# Patient Record
Sex: MALE | Race: WHITE
[De-identification: names, ages, dates, MRNs, and addresses within clinical notes are randomized per-mention and may not be internally consistent; named-entity substitution may affect disease eponyms.]

---

## 2019-02-12 ENCOUNTER — HOSPITAL ENCOUNTER (OUTPATIENT)
Dept: HOSPITAL 90 - SHCH | Age: 74
Discharge: HOME | End: 2019-02-12
Attending: INTERNAL MEDICINE
Payer: OTHER GOVERNMENT

## 2019-02-12 DIAGNOSIS — I25.10: Primary | ICD-10-CM

## 2019-02-12 PROCEDURE — 93017 CV STRESS TEST TRACING ONLY: CPT

## 2019-02-12 PROCEDURE — 78452 HT MUSCLE IMAGE SPECT MULT: CPT

## 2019-02-12 PROCEDURE — 96374 THER/PROPH/DIAG INJ IV PUSH: CPT

## 2019-02-12 RX ADMIN — REGADENOSON SCH MG: 0.08 INJECTION, SOLUTION INTRAVENOUS at 10:31

## 2019-10-22 ENCOUNTER — HOSPITAL ENCOUNTER (OUTPATIENT)
Dept: HOSPITAL 90 - ENDO | Age: 74
Discharge: HOME | End: 2019-10-22
Attending: INTERNAL MEDICINE
Payer: OTHER GOVERNMENT

## 2019-10-22 VITALS — SYSTOLIC BLOOD PRESSURE: 159 MMHG | DIASTOLIC BLOOD PRESSURE: 93 MMHG

## 2019-10-22 VITALS — BODY MASS INDEX: 26.07 KG/M2 | HEIGHT: 68 IN | WEIGHT: 172 LBS

## 2019-10-22 VITALS — SYSTOLIC BLOOD PRESSURE: 133 MMHG | DIASTOLIC BLOOD PRESSURE: 108 MMHG

## 2019-10-22 VITALS — DIASTOLIC BLOOD PRESSURE: 108 MMHG | SYSTOLIC BLOOD PRESSURE: 133 MMHG

## 2019-10-22 VITALS — DIASTOLIC BLOOD PRESSURE: 78 MMHG | SYSTOLIC BLOOD PRESSURE: 153 MMHG

## 2019-10-22 VITALS — DIASTOLIC BLOOD PRESSURE: 104 MMHG | SYSTOLIC BLOOD PRESSURE: 170 MMHG

## 2019-10-22 VITALS — SYSTOLIC BLOOD PRESSURE: 164 MMHG | DIASTOLIC BLOOD PRESSURE: 93 MMHG

## 2019-10-22 DIAGNOSIS — K29.50: ICD-10-CM

## 2019-10-22 DIAGNOSIS — K29.70: ICD-10-CM

## 2019-10-22 DIAGNOSIS — E78.5: ICD-10-CM

## 2019-10-22 DIAGNOSIS — Z79.899: ICD-10-CM

## 2019-10-22 DIAGNOSIS — Z79.82: ICD-10-CM

## 2019-10-22 DIAGNOSIS — E11.40: ICD-10-CM

## 2019-10-22 DIAGNOSIS — K21.0: ICD-10-CM

## 2019-10-22 DIAGNOSIS — K44.9: ICD-10-CM

## 2019-10-22 DIAGNOSIS — I25.10: ICD-10-CM

## 2019-10-22 DIAGNOSIS — I10: ICD-10-CM

## 2019-10-22 DIAGNOSIS — K22.2: Primary | ICD-10-CM

## 2019-10-22 DIAGNOSIS — Z79.84: ICD-10-CM

## 2019-10-22 DIAGNOSIS — Z85.828: ICD-10-CM

## 2019-10-22 PROCEDURE — 43249 ESOPH EGD DILATION <30 MM: CPT

## 2019-10-22 PROCEDURE — 82948 REAGENT STRIP/BLOOD GLUCOSE: CPT

## 2019-10-22 PROCEDURE — 88305 TISSUE EXAM BY PATHOLOGIST: CPT

## 2019-10-22 PROCEDURE — 43239 EGD BIOPSY SINGLE/MULTIPLE: CPT

## 2019-10-22 PROCEDURE — 93005 ELECTROCARDIOGRAM TRACING: CPT

## 2019-10-22 NOTE — NUR
PT WAITING FOR FAMILY  TO PICK HIM UP. PT LEFT VIA WHEELCHAIR IN PVT CAR WITH FAMILY FRIEND. 
RX SCRIPT GIVEN TO FAMILY FRIEND WITH F/U APPT AND D/C INSTRUCTIONS. PT. LEFT WITH NO 
COMPLICATION.